# Patient Record
Sex: FEMALE | Race: BLACK OR AFRICAN AMERICAN
[De-identification: names, ages, dates, MRNs, and addresses within clinical notes are randomized per-mention and may not be internally consistent; named-entity substitution may affect disease eponyms.]

---

## 2018-03-17 ENCOUNTER — HOSPITAL ENCOUNTER (EMERGENCY)
Dept: HOSPITAL 17 - NEPC | Age: 23
Discharge: LEFT BEFORE DISCHARGE | End: 2018-03-17
Payer: COMMERCIAL

## 2018-03-17 VITALS — HEIGHT: 62 IN | WEIGHT: 132.28 LBS | BODY MASS INDEX: 24.34 KG/M2

## 2018-03-17 VITALS
RESPIRATION RATE: 18 BRPM | OXYGEN SATURATION: 100 % | TEMPERATURE: 100.4 F | HEART RATE: 115 BPM | SYSTOLIC BLOOD PRESSURE: 117 MMHG | DIASTOLIC BLOOD PRESSURE: 55 MMHG

## 2018-03-17 DIAGNOSIS — R09.81: ICD-10-CM

## 2018-03-17 DIAGNOSIS — M79.1: ICD-10-CM

## 2018-03-17 DIAGNOSIS — R09.89: ICD-10-CM

## 2018-03-17 DIAGNOSIS — R00.0: ICD-10-CM

## 2018-03-17 DIAGNOSIS — J02.9: ICD-10-CM

## 2018-03-17 DIAGNOSIS — R50.9: Primary | ICD-10-CM

## 2018-03-17 DIAGNOSIS — R52: ICD-10-CM

## 2018-03-17 LAB
COLOR UR: YELLOW
GLUCOSE UR STRIP-MCNC: (no result) MG/DL
HGB UR QL STRIP: (no result)
KETONES UR STRIP-MCNC: (no result) MG/DL
MUCOUS THREADS #/AREA URNS LPF: (no result) /LPF
NITRITE UR QL STRIP: (no result)
SP GR UR STRIP: 1.02 (ref 1–1.03)
SQUAMOUS #/AREA URNS HPF: 2 /HPF (ref 0–5)
URINE LEUKOCYTE ESTERASE: (no result)

## 2018-03-17 PROCEDURE — 99284 EMERGENCY DEPT VISIT MOD MDM: CPT

## 2018-03-17 PROCEDURE — 84703 CHORIONIC GONADOTROPIN ASSAY: CPT

## 2018-03-17 PROCEDURE — 87880 STREP A ASSAY W/OPTIC: CPT

## 2018-03-17 PROCEDURE — 71046 X-RAY EXAM CHEST 2 VIEWS: CPT

## 2018-03-17 PROCEDURE — 87804 INFLUENZA ASSAY W/OPTIC: CPT

## 2018-03-17 PROCEDURE — 81001 URINALYSIS AUTO W/SCOPE: CPT

## 2018-03-17 NOTE — RADRPT
EXAM DATE/TIME:  03/17/2018 05:24 

 

[facility] COMPARISON:     

No previous studies available for comparison.

 

                     

INDICATIONS :     

Weakness and dizziness, nausea, upper left abdomen pain

                     

 

MEDICAL HISTORY :     

None.          

 

SURGICAL HISTORY :     

None.   

 

ENCOUNTER:     

Initial                                        

 

ACUITY:     

1 day      

 

PAIN SCORE:     

8/10

 

LOCATION:     

Bilateral chest 

 

FINDINGS:     

PA and lateral views of the chest demonstrate the lungs to be symmetrically aerated without evidence 
of mass, infiltrate or effusion.  The cardiomediastinal contours are unremarkable.  Osseous structure
s are intact.

 

CONCLUSION:     

No acute cardiopulmonary process.

 

 

 

 Oscar Chu MD on March 17, 2018 at 7:01           

Board Certified Radiologist.

 This report was verified electronically.

## 2018-03-17 NOTE — PD
HPI


Chief Complaint:  Cold / Flu Symptoms


Time Seen by Provider:  04:22


Travel History


International Travel<30 days:  No


Contact w/Intl Traveler<30days:  No


Traveled to known affect area:  No





History of Present Illness


HPI


Patient is a 22-year-old female who comes in complaining of body aches, sore 

throat, runny nose.  She says it started 5 hours ago.  She did not take 

anything for any of her symptoms.  She denies nausea or vomiting.  She denies 

chest pain or shortness of breath.  She denies abdominal pain, nausea or 

vomiting.  Severity is mild to moderate.





PFSH


Past Medical History


Medical History:  Denies Significant Hx


Diminished Hearing:  No


Tetanus Vaccination:  < 5 Years


Influenza Vaccination:  No


Pregnant?:  Not Pregnant


LMP:  02/15/2018





Past Surgical History


Surgical History:  No Previous Surgery





Social History


Alcohol Use:  Yes (SOCIAL)


Tobacco Use:  No


Substance Use:  No





Allergies-Medications


(Allergen,Severity, Reaction):  


Coded Allergies:  


     No Known Allergies (Unverified  Adverse Reaction, Unknown, 3/17/18)


Reported Meds & Prescriptions





Reported Meds & Active Scripts


Active


Amoxicillin 500 Mg Tab 500 Mg PO BID 7 Days


[id number] Mg 5 Mg Tab 5 Mg PO Q8HR


[id number] M1 500 Mg Tab 500 Mg PO Q12








Review of Systems


Except as stated in HPI:  all other systems reviewed are Neg


General / Constitutional:  Positive: Fever


HENT:  Positive: Sore Throat, Congestion, No: Headaches


Cardiovascular:  No: Chest Pain or Discomfort


Respiratory:  No: Shortness of Breath


Gastrointestinal:  No: Nausea, Vomiting, Abdominal Pain


Genitourinary:  No: Dysuria


Musculoskeletal:  Positive: Myalgias


Skin:  No Rash, No Change in Pigmentation





Physical Exam


Narrative


GENERAL: Awake and alert, no acute distress.


SKIN: Focused skin assessment warm/dry.


HEAD: Atraumatic. Normocephalic. 


EYES: Pupils equal and round. No scleral icterus. 


ENT: Tonsillar swelling with exudates.  Mucous membranes pink and moist.


NECK: Trachea midline. No JVD. 


CARDIOVASCULAR: Tachycardia.  No murmur appreciated.


RESPIRATORY: No accessory muscle use. Clear to auscultation. Breath sounds 

equal bilaterally. 


GASTROINTESTINAL: Abdomen soft, non-tender, nondistended. 


MUSCULOSKELETAL: No obvious deformities. No clubbing.  No cyanosis.  No edema. 


NEUROLOGICAL: Awake and alert. No obvious cranial nerve deficits.  Motor 

grossly within normal limits. Normal speech.


PSYCHIATRIC: Appropriate mood and affect; insight and judgment normal.





Data


Data


Last Documented VS





Vital Signs








  Date Time  Temp Pulse Resp B/P (MAP) Pulse Ox O2 Delivery O2 Flow Rate FiO2


 


3/17/18 05:55        


 


3/17/18 02:54 100.4 115 18  100 Room Air  








Orders





 Orders


Iv Access Insert/Monitor (3/17/18 04:23)


Complete Blood Count With Diff (3/17/18 04:23)


Comprehensive Metabolic Panel (3/17/18 04:23)


Group A Rapid Strep Screen (3/17/18 04:23)


Urinalysis - C+S If Indicated (3/17/18 04:23)


Ed Urine Pregnancytest Poc (3/17/18 04:23)


Chest, Pa & Lat (3/17/18 )


Influenzae A/B Antigen (3/17/18 04:23)


Sodium Chlor 0.9% 1000 Ml Inj (Ns 1000 M (3/17/18 04:30)


Ketorolac Inj (Toradol Inj) (3/17/18 04:30)


Ibuprofen (Motrin) (3/17/18 05:30)





Labs





Laboratory Tests








Test


  3/17/18


04:55


 


Urine Color YELLOW 


 


Urine Turbidity CLEAR 


 


Urine pH 7.5 


 


Urine Specific Gravity 1.021 


 


Urine Protein NEG mg/dL 


 


Urine Glucose (UA) NEG mg/dL 


 


Urine Ketones NEG mg/dL 


 


Urine Occult Blood MOD 


 


Urine Nitrite NEG 


 


Urine Bilirubin NEG 


 


Urine Urobilinogen


  LESS THAN 2.0


MG/DL


 


Urine Leukocyte Esterase NEG 


 


Urine RBC 1 /hpf 


 


Urine WBC 1 /hpf 


 


Urine Squamous Epithelial


Cells 2 /hpf 


 


 


Urine Mucus FEW /lpf 


 


Microscopic Urinalysis Comment


  CULT NOT


INDICATED











MDM


Medical Decision Making


Medical Screen Exam Complete:  Yes


Emergency Medical Condition:  Yes


Differential Diagnosis


Strep pharyngitis versus influenza versus viral illness versus pneumonia versus 

UTI


Narrative Course


Patient is a 22-year-old female who comes in complaining of fever, body aches, 

congestion, sore throat.  Exam shows tonsillar swelling and exudates.  Labs 

ordered as well as an IV and IV fluids.  She is refusing any labs or the IV 

fluids.  Offer ibuprofen.  Strep test was sent and is positive.  When results 

came back, patient was no longer in the emergency department.  She left and did 

not tell anyone.  She cannot be found throughout the emergency department.  She 

did elope.


Patient Instructions:  General Instructions


Departure Forms:  Tests/Procedures


Disposition:  07 AGAINST MEDICAL ADVICE


Condition:  Stable











Carole Vogel MD Mar 17, 2018 07:03